# Patient Record
Sex: FEMALE | Race: WHITE | NOT HISPANIC OR LATINO
[De-identification: names, ages, dates, MRNs, and addresses within clinical notes are randomized per-mention and may not be internally consistent; named-entity substitution may affect disease eponyms.]

---

## 2017-07-12 ENCOUNTER — APPOINTMENT (OUTPATIENT)
Dept: CARDIOLOGY | Facility: CLINIC | Age: 62
End: 2017-07-12

## 2017-07-26 ENCOUNTER — APPOINTMENT (OUTPATIENT)
Dept: CARDIOLOGY | Facility: CLINIC | Age: 62
End: 2017-07-26

## 2021-06-23 ENCOUNTER — RX CHANGE (OUTPATIENT)
Age: 66
End: 2021-06-23

## 2021-07-29 ENCOUNTER — RX CHANGE (OUTPATIENT)
Age: 66
End: 2021-07-29

## 2021-08-18 ENCOUNTER — APPOINTMENT (OUTPATIENT)
Dept: ENDOCRINOLOGY | Facility: CLINIC | Age: 66
End: 2021-08-18

## 2023-05-28 ENCOUNTER — APPOINTMENT (OUTPATIENT)
Dept: ORTHOPEDIC SURGERY | Facility: CLINIC | Age: 68
End: 2023-05-28
Payer: MEDICARE

## 2023-05-28 VITALS — HEIGHT: 65 IN | WEIGHT: 112 LBS | BODY MASS INDEX: 18.66 KG/M2

## 2023-05-28 PROCEDURE — 99203 OFFICE O/P NEW LOW 30 MIN: CPT

## 2023-05-28 PROCEDURE — E0114: CPT | Mod: NU

## 2023-05-28 PROCEDURE — 73502 X-RAY EXAM HIP UNI 2-3 VIEWS: CPT

## 2023-05-28 RX ORDER — HYDROCODONE BITARTRATE AND ACETAMINOPHEN 5; 325 MG/1; MG/1
5-325 TABLET ORAL
Qty: 10 | Refills: 0 | Status: ACTIVE | COMMUNITY
Start: 2023-05-28 | End: 1900-01-01

## 2023-05-28 RX ORDER — MESALAMINE
POWDER (GRAM) MISCELLANEOUS
Refills: 0 | Status: ACTIVE | COMMUNITY

## 2023-05-28 NOTE — IMAGING
[Bilateral] : hip with pelvis bilaterally [de-identified] : pt with no skin changes or bony deformity.\par TTP over the left pubic ramus region.\par Pelvic Compression produces no instability\par Bilateral hips with full and pain free ROM.\par Left hip flexion and adduction with 4/5 strength due to discomfort.\par LLE is nvi.\par SLR tests are negative.\par there is no ttp over the lumbar spine or SI joints. \par Gait is antalgic to the left.  [FreeTextEntry9] : Prateek non displaced inferior pubic ramus fracture.

## 2023-05-28 NOTE — ASSESSMENT
[FreeTextEntry1] : The patient was advised of the diagnosis. The natural history of the pathology was explained in full to the patient in layman's terms. All questions were answered. The risks and benefits of surgical and non-surgical treatment alternatives were explained in full to the patient.\par \par Ambulate with crutches pwb to the LLE x 4-6 weeks.\par Vicodin 5/325 q4h prn pain x 5 days\par Pt will rto in 2 weeks with Dr. Sorensen\par

## 2023-05-28 NOTE — HISTORY OF PRESENT ILLNESS
[9] : 9 [6] : 6 [de-identified] : 5/28/2023: Pt tripped while walking backwards today and now complains of left hip/groin pain. \par \par PMH: HTN, Hyperlipidemia, Crohns Dz. \par Allergies: NKDA [FreeTextEntry1] : lt hip  [FreeTextEntry5] : pt fell today and injured lt hip, pt c.o pain in lt groin

## 2023-06-12 ENCOUNTER — APPOINTMENT (OUTPATIENT)
Dept: ORTHOPEDIC SURGERY | Facility: CLINIC | Age: 68
End: 2023-06-12
Payer: MEDICARE

## 2023-06-12 VITALS — HEIGHT: 65 IN | BODY MASS INDEX: 18.66 KG/M2 | WEIGHT: 112 LBS

## 2023-06-12 DIAGNOSIS — S32.592A OTHER SPECIFIED FRACTURE OF LEFT PUBIS, INITIAL ENCOUNTER FOR CLOSED FRACTURE: ICD-10-CM

## 2023-06-12 PROCEDURE — 99214 OFFICE O/P EST MOD 30 MIN: CPT | Mod: 25

## 2023-06-12 PROCEDURE — 27197 CLSD TX PELVIC RING FX: CPT

## 2023-06-12 PROCEDURE — 73502 X-RAY EXAM HIP UNI 2-3 VIEWS: CPT

## 2023-06-12 NOTE — ASSESSMENT
[FreeTextEntry1] : The patient was advised of the diagnosis. The natural history of the pathology was explained in full to the patient in layman's terms. All questions were answered. The risks and benefits of surgical and non-surgical treatment alternatives were explained in full to the patient.\par \par Pt will continue pwb to the LLE with crutches\par RTO PRN\par Pt was given referral for bone density test.

## 2023-06-12 NOTE — HISTORY OF PRESENT ILLNESS
[9] : 9 [6] : 6 [de-identified] : 6/12/2023: Pt here for f/u of left inferior pubic ramus fracture.\par Ms. Wiggins has been utilizing crutches for partial weightbearing to the LLE.\par \par \par 5/28/2023: Pt tripped while walking backwards today and now complains of left hip/groin pain. \par \par PMH: HTN, Hyperlipidemia, Crohns Dz. \par Allergies: NKDA [FreeTextEntry1] : lt hip  [FreeTextEntry5] : pt fell today and injured lt hip, pt c.o pain in lt groin

## 2023-06-12 NOTE — IMAGING
[de-identified] : pt with no skin changes or bony deformity.\par TTP over the left pubic ramus region only.\par Pelvic Compression produces no instability\par Bilateral hips with full and pain free ROM.\par Left hip flexion and adduction with 4/5 strength due to discomfort.\par LLE is nvi.\par SLR tests are negative.\par there is no ttp over the lumbar spine or SI joints. \par Gait is antalgic to the left.  [The fracture is in acceptable alignment. There is progression in healing seen] : The fracture is in acceptable alignment. There is progression in healing seen

## 2023-08-02 ENCOUNTER — RESULT REVIEW (OUTPATIENT)
Age: 68
End: 2023-08-02